# Patient Record
Sex: MALE | Race: WHITE | HISPANIC OR LATINO | Employment: FULL TIME | URBAN - METROPOLITAN AREA
[De-identification: names, ages, dates, MRNs, and addresses within clinical notes are randomized per-mention and may not be internally consistent; named-entity substitution may affect disease eponyms.]

---

## 2020-10-29 ENCOUNTER — HOSPITAL LABORATORY (OUTPATIENT)
Dept: OTHER | Facility: CLINIC | Age: 45
End: 2020-10-29

## 2020-11-05 LAB
BACTERIA SPEC CULT: NO GROWTH
BACTERIA SPEC CULT: NORMAL
Lab: NORMAL
Lab: NORMAL
SPECIMEN SOURCE: NORMAL
SPECIMEN SOURCE: NORMAL

## 2024-04-23 ENCOUNTER — LAB (OUTPATIENT)
Dept: LAB | Facility: CLINIC | Age: 49
End: 2024-04-23
Payer: COMMERCIAL

## 2024-04-23 DIAGNOSIS — R73.03 DIABETES MELLITUS, LATENT: ICD-10-CM

## 2024-04-23 DIAGNOSIS — E55.9 AVITAMINOSIS D: ICD-10-CM

## 2024-04-23 DIAGNOSIS — G62.9 PERIPHERAL NERVE DISORDER: ICD-10-CM

## 2024-04-23 DIAGNOSIS — M79.672 LEFT FOOT PAIN: ICD-10-CM

## 2024-04-23 DIAGNOSIS — I51.9 MYXEDEMA HEART DISEASE: ICD-10-CM

## 2024-04-23 DIAGNOSIS — M79.671 RIGHT FOOT PAIN: Primary | ICD-10-CM

## 2024-04-23 DIAGNOSIS — E03.9 MYXEDEMA HEART DISEASE: ICD-10-CM

## 2024-04-23 LAB
HBA1C MFR BLD: 5.1 %
TOTAL PROTEIN SERUM FOR ELP: 7.6 G/DL (ref 6.4–8.3)
TSH SERPL DL<=0.005 MIU/L-ACNC: 1.79 UIU/ML (ref 0.3–4.2)
VIT B12 SERPL-MCNC: <150 PG/ML (ref 232–1245)
VIT D+METAB SERPL-MCNC: 35 NG/ML (ref 20–50)

## 2024-04-23 PROCEDURE — 84165 PROTEIN E-PHORESIS SERUM: CPT | Mod: TC | Performed by: PATHOLOGY

## 2024-04-23 PROCEDURE — 36415 COLL VENOUS BLD VENIPUNCTURE: CPT

## 2024-04-23 PROCEDURE — 84165 PROTEIN E-PHORESIS SERUM: CPT | Mod: 26 | Performed by: PATHOLOGY

## 2024-04-23 PROCEDURE — 84443 ASSAY THYROID STIM HORMONE: CPT

## 2024-04-23 PROCEDURE — 82607 VITAMIN B-12: CPT

## 2024-04-23 PROCEDURE — 83921 ORGANIC ACID SINGLE QUANT: CPT

## 2024-04-23 PROCEDURE — 82306 VITAMIN D 25 HYDROXY: CPT

## 2024-04-23 PROCEDURE — 83036 HEMOGLOBIN GLYCOSYLATED A1C: CPT

## 2024-04-23 PROCEDURE — 84155 ASSAY OF PROTEIN SERUM: CPT

## 2024-04-24 LAB
ALBUMIN SERPL ELPH-MCNC: 5 G/DL (ref 3.7–5.1)
ALPHA1 GLOB SERPL ELPH-MCNC: 0.2 G/DL (ref 0.2–0.4)
ALPHA2 GLOB SERPL ELPH-MCNC: 0.6 G/DL (ref 0.5–0.9)
B-GLOBULIN SERPL ELPH-MCNC: 0.8 G/DL (ref 0.6–1)
GAMMA GLOB SERPL ELPH-MCNC: 1.1 G/DL (ref 0.7–1.6)
M PROTEIN SERPL ELPH-MCNC: 0 G/DL
PATH REPORT.COMMENTS IMP SPEC: NORMAL
PROT PATTERN SERPL ELPH-IMP: NORMAL

## 2024-04-25 LAB — METHYLMALONATE SERPL-SCNC: 0.36 UMOL/L (ref 0–0.4)

## 2024-07-19 ENCOUNTER — TRANSCRIBE ORDERS (OUTPATIENT)
Dept: OTHER | Age: 49
End: 2024-07-19

## 2024-07-19 DIAGNOSIS — M79.671 BILATERAL FOOT PAIN: Primary | ICD-10-CM

## 2024-07-19 DIAGNOSIS — M79.672 BILATERAL FOOT PAIN: Primary | ICD-10-CM

## 2024-10-09 ENCOUNTER — OFFICE VISIT (OUTPATIENT)
Dept: NEUROLOGY | Facility: CLINIC | Age: 49
End: 2024-10-09
Payer: COMMERCIAL

## 2024-10-09 DIAGNOSIS — R20.9 DISTURBANCE OF SKIN SENSATION: Primary | ICD-10-CM

## 2024-10-09 PROCEDURE — 11105 PUNCH BX SKIN EA SEP/ADDL: CPT | Performed by: PSYCHIATRY & NEUROLOGY

## 2024-10-09 PROCEDURE — 11104 PUNCH BX SKIN SINGLE LESION: CPT | Performed by: PSYCHIATRY & NEUROLOGY

## 2024-10-09 PROCEDURE — 88350 IMFLUOR EA ADDL 1ANTB STN PX: CPT | Performed by: PSYCHIATRY & NEUROLOGY

## 2024-10-09 PROCEDURE — 88348 ELECTRON MICROSCOPY DX: CPT | Performed by: PSYCHIATRY & NEUROLOGY

## 2024-10-09 NOTE — PROGRESS NOTES
Procedure note: Skin biopsy. Indication: sensory disturbance, skin. After obtaining informed consent, 3 mm skin biopsies were performed over the right foot and proximal calf. 1% lidocaine anesthesia and betadine sterile prep were used. The patient tolerated the procedure well. Wound care and patient education were provided by Tamera Solares.

## 2024-10-09 NOTE — LETTER
10/9/2024       RE: Gabriele Larkin  [address] [facility] [address]     Dear Colleague,    Thank you for referring your patient, Gabriele Larkin, to the [facility] [place] at [facility]. Please see a copy of my visit note below.    Procedure note: Skin biopsy. Indication: sensory disturbance, skin. After obtaining informed consent, 3 mm skin biopsies were performed over the right foot and proximal calf. 1% lidocaine anesthesia and betadine sterile prep were used. The patient tolerated the procedure well. Wound care and patient education were provided by Tamera Solares.       Again, thank you for allowing me to participate in the care of your patient.      Sincerely,    George Bowers MD

## 2024-10-28 ENCOUNTER — TELEPHONE (OUTPATIENT)
Dept: INTERVENTIONAL RADIOLOGY/VASCULAR | Facility: CLINIC | Age: 49
End: 2024-10-28
Payer: COMMERCIAL

## 2024-10-29 ENCOUNTER — APPOINTMENT (OUTPATIENT)
Dept: LAB | Facility: HOSPITAL | Age: 49
End: 2024-10-29
Attending: PHYSICIAN ASSISTANT
Payer: COMMERCIAL

## 2024-11-15 LAB — SCANNED LAB RESULT: NORMAL

## 2024-11-16 ENCOUNTER — HEALTH MAINTENANCE LETTER (OUTPATIENT)
Age: 49
End: 2024-11-16

## 2024-12-12 ENCOUNTER — TELEPHONE (OUTPATIENT)
Dept: INTERVENTIONAL RADIOLOGY/VASCULAR | Facility: CLINIC | Age: 49
End: 2024-12-12
Payer: COMMERCIAL

## 2024-12-13 ENCOUNTER — HOSPITAL ENCOUNTER (OUTPATIENT)
Dept: MRI IMAGING | Facility: HOSPITAL | Age: 49
Discharge: HOME | End: 2024-12-13
Attending: PHYSICIAN ASSISTANT
Payer: COMMERCIAL

## 2024-12-13 ENCOUNTER — HOSPITAL ENCOUNTER (OUTPATIENT)
Dept: RADIOLOGY | Facility: HOSPITAL | Age: 49
Discharge: HOME | End: 2024-12-13
Attending: PHYSICIAN ASSISTANT
Payer: COMMERCIAL

## 2024-12-13 ENCOUNTER — LAB (OUTPATIENT)
Dept: LAB | Facility: HOSPITAL | Age: 49
End: 2024-12-13
Attending: PHYSICIAN ASSISTANT
Payer: COMMERCIAL

## 2024-12-13 VITALS — HEART RATE: 61 BPM | DIASTOLIC BLOOD PRESSURE: 86 MMHG | OXYGEN SATURATION: 97 % | SYSTOLIC BLOOD PRESSURE: 131 MMHG

## 2024-12-13 DIAGNOSIS — M79.672 BILATERAL FOOT PAIN: ICD-10-CM

## 2024-12-13 DIAGNOSIS — M79.671 BILATERAL FOOT PAIN: Primary | ICD-10-CM

## 2024-12-13 DIAGNOSIS — G95.9 SPINAL CORD LESION (H): ICD-10-CM

## 2024-12-13 DIAGNOSIS — G95.9 SPINAL CORD LESION (H): Primary | ICD-10-CM

## 2024-12-13 DIAGNOSIS — M79.671 BILATERAL FOOT PAIN: ICD-10-CM

## 2024-12-13 DIAGNOSIS — M79.672 BILATERAL FOOT PAIN: Primary | ICD-10-CM

## 2024-12-13 LAB
APPEARANCE CSF: CLEAR
COLOR CSF: COLORLESS
GLUCOSE CSF-MCNC: 59 MG/DL (ref 40–70)
HIV 1+2 AB+HIV1 P24 AG SERPL QL IA: NONREACTIVE
PROT CSF-MCNC: 26.9 MG/DL (ref 15–45)
RBC # CSF MANUAL: 3 /UL (ref 0–2)
TUBE # CSF: 4
WBC # CSF MANUAL: 1 /UL (ref 0–5)

## 2024-12-13 PROCEDURE — 87798 DETECT AGENT NOS DNA AMP: CPT

## 2024-12-13 PROCEDURE — 72156 MRI NECK SPINE W/O & W/DYE: CPT

## 2024-12-13 PROCEDURE — 87389 HIV-1 AG W/HIV-1&-2 AB AG IA: CPT

## 2024-12-13 PROCEDURE — 87529 HSV DNA AMP PROBE: CPT

## 2024-12-13 PROCEDURE — 86592 SYPHILIS TEST NON-TREP QUAL: CPT

## 2024-12-13 PROCEDURE — 84157 ASSAY OF PROTEIN OTHER: CPT

## 2024-12-13 PROCEDURE — 88108 CYTOPATH CONCENTRATE TECH: CPT | Mod: TC

## 2024-12-13 PROCEDURE — 82784 ASSAY IGA/IGD/IGG/IGM EACH: CPT

## 2024-12-13 PROCEDURE — 89050 BODY FLUID CELL COUNT: CPT

## 2024-12-13 PROCEDURE — 87476 LYME DIS DNA AMP PROBE: CPT

## 2024-12-13 PROCEDURE — 82164 ANGIOTENSIN I ENZYME TEST: CPT

## 2024-12-13 PROCEDURE — 62328 DX LMBR SPI PNXR W/FLUOR/CT: CPT

## 2024-12-13 PROCEDURE — 87116 MYCOBACTERIA CULTURE: CPT

## 2024-12-13 PROCEDURE — 82945 GLUCOSE OTHER FLUID: CPT

## 2024-12-13 PROCEDURE — 255N000002 HC RX 255 OP 636: Performed by: PSYCHIATRY & NEUROLOGY

## 2024-12-13 PROCEDURE — A9585 GADOBUTROL INJECTION: HCPCS | Performed by: PSYCHIATRY & NEUROLOGY

## 2024-12-13 PROCEDURE — 87206 SMEAR FLUORESCENT/ACID STAI: CPT

## 2024-12-13 PROCEDURE — 86788 WEST NILE VIRUS AB IGM: CPT

## 2024-12-13 PROCEDURE — 36415 COLL VENOUS BLD VENIPUNCTURE: CPT

## 2024-12-13 RX ORDER — GADOBUTROL 604.72 MG/ML
0.1 INJECTION INTRAVENOUS ONCE
Status: COMPLETED | OUTPATIENT
Start: 2024-12-13 | End: 2024-12-13

## 2024-12-13 RX ADMIN — GADOBUTROL 7 ML: 604.72 INJECTION INTRAVENOUS at 07:53

## 2024-12-13 NOTE — DISCHARGE INSTRUCTIONS
Lumbar Puncture Discharge Instructions:  You had a lumbar puncture procedure, a medical procedure that can involve collecting a sample of cerebrospinal fluid (CSF - fluid that surrounds your spinal cord and brain).    Please follow these instructions as you recover:  - No driving for the next 24 hours.  - Remain motionless on your back in a flat position as much as possible for 12-16 hours after your lumbar puncture.  - When standing/walking, move slowly and passively.  - You may remove your Band-Aid from your lower back tomorrow.  - You may shower 24 hours after your procedure. Avoid direct contact with the shower water to your procedure site. Pat dry.  - No soaking (submerging under water) in hottubs, bathtubs and/or swimming pools until your wound is fully healed (approximately one week).    If you develop a headache when standing and it goes away when you lay down, you should lie down the rest of the day and increase your fluid intake.    Seek medical assistance for the following:   - If your headache does not go away after laying flat.  - If you have drainage from your puncture site.  - If your puncture site becomes red, has increased bruising and/or an increase in swelling.  - If you develop a fever over 100.4 F  *For sudden or severe symptoms, please go to the Emergency Department or call 911.

## 2024-12-13 NOTE — PROGRESS NOTES
Discussed the importance of lying flat for today with patient to prevent spinal leak.  Also instructed pt to have a  today. Pt verbalized understanding and discussed risk of not laying flat with MD as well as pt does have plans for later this evening.  Pt ambulatory at time of discharge and denies pain.

## 2024-12-14 LAB
HSV1 DNA CSF QL NAA+PROBE: NOT DETECTED
HSV2 DNA CSF QL NAA+PROBE: NOT DETECTED
SPECIMEN TYPE: NORMAL
VZV DNA SPEC QL NAA+PROBE: NOT DETECTED

## 2024-12-15 LAB
ACID FAST STAIN (ARUP): NORMAL
ALB CSF/SERPL: 3.9 RATIO
ALBUMIN CSF-MCNC: 18 MG/DL
ALBUMIN SERPL-MCNC: 4632 MG/DL
IGG CSF-MCNC: 2.5 MG/DL
IGG SERPL-MCNC: 1132 MG/DL
IGG SYNTH RATE SER+CSF CALC-MRATE: <0 MG/D
IGG/ALB CLEAR SER+CSF-RTO: 0.57 RATIO
IGG/ALB CSF: 0.14 RATIO
OLIGOCLONAL BANDS CSF ELPH-IMP: ABNORMAL
OLIGOCLONAL BANDS CSF ELPH-IMP: POSITIVE
OLIGOCLONAL BANDS CSF IEF: 10 BANDS
WNV IGG CSF IA-ACNC: 0.08 IV
WNV IGM CSF IA-ACNC: 0 IV

## 2024-12-16 LAB
ACE CSF-CCNC: 1.5 U/L
B BURGDOR DNA SPEC QL NAA+PROBE: NOT DETECTED
EBV DNA SPEC QL NAA+PROBE: NOT DETECTED
PATH REPORT.COMMENTS IMP SPEC: NORMAL
PATH REPORT.FINAL DX SPEC: NORMAL
PATH REPORT.GROSS SPEC: NORMAL
PATH REPORT.MICROSCOPIC SPEC OTHER STN: NORMAL
SCANNED LAB RESULT: NORMAL
VDRL CSF QL: NON REACTIVE

## 2024-12-17 ENCOUNTER — ANESTHESIA EVENT (OUTPATIENT)
Dept: EMERGENCY MEDICINE | Facility: HOSPITAL | Age: 49
End: 2024-12-17
Payer: COMMERCIAL

## 2024-12-17 ENCOUNTER — ANESTHESIA (OUTPATIENT)
Dept: EMERGENCY MEDICINE | Facility: HOSPITAL | Age: 49
End: 2024-12-17
Payer: COMMERCIAL

## 2024-12-17 ENCOUNTER — HOSPITAL ENCOUNTER (EMERGENCY)
Facility: HOSPITAL | Age: 49
Discharge: HOME | End: 2024-12-17
Attending: EMERGENCY MEDICINE
Payer: COMMERCIAL

## 2024-12-17 VITALS
HEIGHT: 68 IN | TEMPERATURE: 98.2 F | HEART RATE: 60 BPM | BODY MASS INDEX: 23.49 KG/M2 | SYSTOLIC BLOOD PRESSURE: 119 MMHG | WEIGHT: 155 LBS | RESPIRATION RATE: 18 BRPM | OXYGEN SATURATION: 98 % | DIASTOLIC BLOOD PRESSURE: 71 MMHG

## 2024-12-17 DIAGNOSIS — G97.1 POST LUMBAR PUNCTURE HEADACHE: ICD-10-CM

## 2024-12-17 LAB
ABO + RH BLD: NORMAL
ANION GAP SERPL CALCULATED.3IONS-SCNC: 13 MMOL/L (ref 7–15)
BASOPHILS # BLD AUTO: 0.1 10E3/UL (ref 0–0.2)
BASOPHILS NFR BLD AUTO: 1 %
BLD GP AB SCN SERPL QL: NEGATIVE
BUN SERPL-MCNC: 12.7 MG/DL (ref 6–20)
CALCIUM SERPL-MCNC: 10.1 MG/DL (ref 8.8–10.4)
CHLORIDE SERPL-SCNC: 100 MMOL/L (ref 98–107)
CREAT SERPL-MCNC: 0.9 MG/DL (ref 0.67–1.17)
EGFRCR SERPLBLD CKD-EPI 2021: >90 ML/MIN/1.73M2
EOSINOPHIL # BLD AUTO: 0.1 10E3/UL (ref 0–0.7)
EOSINOPHIL NFR BLD AUTO: 2 %
ERYTHROCYTE [DISTWIDTH] IN BLOOD BY AUTOMATED COUNT: 12 % (ref 10–15)
GLUCOSE SERPL-MCNC: 96 MG/DL (ref 70–99)
HCO3 SERPL-SCNC: 27 MMOL/L (ref 22–29)
HCT VFR BLD AUTO: 46.1 % (ref 40–53)
HGB BLD-MCNC: 16 G/DL (ref 13.3–17.7)
IMM GRANULOCYTES # BLD: 0 10E3/UL
IMM GRANULOCYTES NFR BLD: 1 %
INR PPP: 0.91 (ref 0.85–1.15)
JCPYV DNA SPEC QL NAA+PROBE: NOT DETECTED
LYMPHOCYTES # BLD AUTO: 1.8 10E3/UL (ref 0.8–5.3)
LYMPHOCYTES NFR BLD AUTO: 27 %
MCH RBC QN AUTO: 32.9 PG (ref 26.5–33)
MCHC RBC AUTO-ENTMCNC: 34.7 G/DL (ref 31.5–36.5)
MCV RBC AUTO: 95 FL (ref 78–100)
MONOCYTES # BLD AUTO: 0.4 10E3/UL (ref 0–1.3)
MONOCYTES NFR BLD AUTO: 6 %
NEUTROPHILS # BLD AUTO: 4.3 10E3/UL (ref 1.6–8.3)
NEUTROPHILS NFR BLD AUTO: 65 %
NRBC # BLD AUTO: 0 10E3/UL
NRBC BLD AUTO-RTO: 0 /100
PLATELET # BLD AUTO: 258 10E3/UL (ref 150–450)
POTASSIUM SERPL-SCNC: 4.1 MMOL/L (ref 3.4–5.3)
RBC # BLD AUTO: 4.87 10E6/UL (ref 4.4–5.9)
SODIUM SERPL-SCNC: 140 MMOL/L (ref 135–145)
SPECIMEN EXP DATE BLD: NORMAL
WBC # BLD AUTO: 6.7 10E3/UL (ref 4–11)

## 2024-12-17 PROCEDURE — 250N000013 HC RX MED GY IP 250 OP 250 PS 637: Performed by: EMERGENCY MEDICINE

## 2024-12-17 PROCEDURE — 82374 ASSAY BLOOD CARBON DIOXIDE: CPT | Performed by: EMERGENCY MEDICINE

## 2024-12-17 PROCEDURE — 36415 COLL VENOUS BLD VENIPUNCTURE: CPT | Performed by: EMERGENCY MEDICINE

## 2024-12-17 PROCEDURE — 80048 BASIC METABOLIC PNL TOTAL CA: CPT | Performed by: EMERGENCY MEDICINE

## 2024-12-17 PROCEDURE — 85610 PROTHROMBIN TIME: CPT | Performed by: EMERGENCY MEDICINE

## 2024-12-17 PROCEDURE — 250N000011 HC RX IP 250 OP 636: Performed by: EMERGENCY MEDICINE

## 2024-12-17 PROCEDURE — 96374 THER/PROPH/DIAG INJ IV PUSH: CPT | Mod: 59

## 2024-12-17 PROCEDURE — 62273 INJECT EPIDURAL PATCH: CPT

## 2024-12-17 PROCEDURE — 86900 BLOOD TYPING SEROLOGIC ABO: CPT | Performed by: EMERGENCY MEDICINE

## 2024-12-17 PROCEDURE — 258N000003 HC RX IP 258 OP 636: Performed by: EMERGENCY MEDICINE

## 2024-12-17 PROCEDURE — 370N000003 HC ANESTHESIA WARD SERVICE: Performed by: STUDENT IN AN ORGANIZED HEALTH CARE EDUCATION/TRAINING PROGRAM

## 2024-12-17 PROCEDURE — 250N000012 HC RX MED GY IP 250 OP 636 PS 637: Performed by: EMERGENCY MEDICINE

## 2024-12-17 PROCEDURE — 85004 AUTOMATED DIFF WBC COUNT: CPT | Performed by: EMERGENCY MEDICINE

## 2024-12-17 PROCEDURE — 96361 HYDRATE IV INFUSION ADD-ON: CPT | Mod: 59

## 2024-12-17 PROCEDURE — 99285 EMERGENCY DEPT VISIT HI MDM: CPT | Mod: 25

## 2024-12-17 RX ORDER — BUTALBITAL, ACETAMINOPHEN AND CAFFEINE 50; 325; 40 MG/1; MG/1; MG/1
1 TABLET ORAL ONCE
Status: COMPLETED | OUTPATIENT
Start: 2024-12-17 | End: 2024-12-17

## 2024-12-17 RX ORDER — KETOROLAC TROMETHAMINE 15 MG/ML
15 INJECTION, SOLUTION INTRAMUSCULAR; INTRAVENOUS ONCE
Status: COMPLETED | OUTPATIENT
Start: 2024-12-17 | End: 2024-12-17

## 2024-12-17 RX ADMIN — BUTALBITAL, ACETAMINOPHEN, AND CAFFEINE 1 TABLET: 50; 325; 40 TABLET ORAL at 13:37

## 2024-12-17 RX ADMIN — DEXAMETHASONE 10 MG: 2 TABLET ORAL at 13:37

## 2024-12-17 RX ADMIN — SODIUM CHLORIDE 1000 ML: 9 INJECTION, SOLUTION INTRAVENOUS at 13:39

## 2024-12-17 RX ADMIN — KETOROLAC TROMETHAMINE 15 MG: 15 INJECTION, SOLUTION INTRAMUSCULAR; INTRAVENOUS at 13:40

## 2024-12-17 ASSESSMENT — ACTIVITIES OF DAILY LIVING (ADL)
ADLS_ACUITY_SCORE: 41

## 2024-12-17 ASSESSMENT — COLUMBIA-SUICIDE SEVERITY RATING SCALE - C-SSRS
1. IN THE PAST MONTH, HAVE YOU WISHED YOU WERE DEAD OR WISHED YOU COULD GO TO SLEEP AND NOT WAKE UP?: NO
2. HAVE YOU ACTUALLY HAD ANY THOUGHTS OF KILLING YOURSELF IN THE PAST MONTH?: NO
6. HAVE YOU EVER DONE ANYTHING, STARTED TO DO ANYTHING, OR PREPARED TO DO ANYTHING TO END YOUR LIFE?: NO

## 2024-12-17 NOTE — Clinical Note
Gabriele Larkin was seen and treated in our emergency department on 12/17/2024.  He may return to work on 12/19/2024.       If you have any questions or concerns, please don't hesitate to call.      Keila Marshall MD

## 2024-12-17 NOTE — ED PROVIDER NOTES
EMERGENCY DEPARTMENT ENCOUNTER      NAME: Gabriele Larkin  AGE: 49 year old male  YOB: 1975  MRN: [id number]  EVALUATION DATE & TIME: 12/17/2024  1:44 PM    PCP: No Ref-Primary, Physician    ED PROVIDER: Keila Marshall M.D.      Chief Complaint   Patient presents with    Headache         FINAL IMPRESSION:  1. Post lumbar puncture headache          ED COURSE & MEDICAL DECISION MAKING:    ED Course as of 12/17/24 1640   Tue Dec 17, 2024   1218 Pt here s/p 17 mL removed 12/13 on outpatient scheduled lumbar puncture ordered by neurologist for bilateral foot tingling chronic ongoing, with headache and s/p 2 days ago 12/15 [place] ED visit and improved 4/10 headache after NSAID/excedrin/compazine, now with 6/10 headache, neuro intact, will d/w IR who did LP blood patch vs. Anesthesia and determine options, as he is neuro intact pending decadron, butalbital-tylenol-caffeine, toradol in interim with IVF   1253 I spoke with interventional neuroradiologist Dr Bentley with recent changes re: who does blood patch, she notes they did not do the procedure or blood patches at this point, does not go to [facility], likely to need one, will d/w anesthesia here   1345 Per [facility], anesthesia had reported they don't overall do blood patches, but then [facility] investigated and found they can indeed do them. I called anesthesia *[id number] and Dr Figueroa was in procedure, notes she will call me back in the ER shortly   1356 I spoke with Dr Figueroa from anesthesia who notes they can bring with BMP and CBC WNL and T&S done, platelet count 258, whe will inestigate whether they can take patient to PACU for blood patch or not. She will then call me back shortly   1415 Patient updated re: awaiting callback, on IV and receiving medications   1439 Per Anesthesia Dr Figueroa at bedside,  will do blood patch here as PACU too full, charge LARRY Griffith getting room available and anesthesia getting supplies to do blood patch, then patient to [facility]  1h afterwards and will observe here prior to discharge, patient consenting and feeling well   1622 Pt s/p blood patch at 1540, feeling well, laying flat, IVF still ongoing   1639 Pt feeling improved still after blood patch and eager for discharge, pain free. Patient discharged after being provided with extensive anticipatory guidance and given return precautions, importance of PMD follow-up emphasized.        Pertinent Labs & Imaging studies reviewed. (See chart for details)    Medical Decision Making  Obtained supplemental history:Supplemental history obtained?: No  Reviewed external records: External records reviewed?: Documented in chart  Care impacted by chronic illness:Documented in Chart  Did you consider but not order tests?: Work up considered but not performed and documented in chart, if applicable  Did you interpret images independently?: Independent interpretation of ECG and images noted in documentation, when applicable.  Consultation discussion with other provider:Did you involve another provider (consultant, , pharmacy, etc.)?: I discussed the care with another health care provider, see documentation for details.  Discharge. No recommendations on prescription strength medication(s). I considered admission, but discharged patient after significant clinical improvement.    MIPS: Not Applicable      At the conclusion of the encounter I discussed the results of all of the tests and the disposition. The questions were answered. The patient or family acknowledged understanding and was agreeable with the care plan.     MEDICATIONS GIVEN IN THE EMERGENCY:  Medications   ketorolac (TORADOL) injection 15 mg (15 mg Intravenous $Given 12/17/24 1340)   butalbital-acetaminophen-caffeine (ESGIC) per tablet 1 tablet (1 tablet Oral $Given 12/17/24 1337)   dexAMETHasone (DECADRON) tablet 10 mg (10 mg Oral $Given 12/17/24 1337)   sodium chloride 0.9% BOLUS 1,000 mL (0 mLs Intravenous Stopped 12/17/24 1446)       NEW  PRESCRIPTIONS STARTED AT TODAY'S ER VISIT  New Prescriptions    No medications on file          =================================================================    [facility]      Gabriele Larkin is a 49 year old male with PMHx of s/p lumbar puncture on 12/13 (4 days ago) who presents to the ED today via walk in with a headache.     Per chart review, the patient was seen in the [facility] ED on 12/15/2024 for evaluation of a headache. Patient describes that he had a lumbar puncture on 12/13 (4 days ago) at [place] where 17 ml of CSF was removed. He reports that he spent most of that day laying down and felt fine upon returning home. He developed a headache on 12/14 (3 days ago) and had been taking tylenol without relief. Spoke with the on-call anesthesiology team who unfortunately do not perform blood patches in the ED. In the ED, attempted symptom control with IV headache medications. Patient was discharged with plan to follow up closely with outpatient Neurology team at [place].     Per the patient, he endorses an ongoing headache since his lumbar puncture on 12/13 (4 days ago). He reports that the headache worsened on 12/15 (2 days ago) and has not taken any medications for the headache. Today, patient rates the headache a 6/10. He denies an history of vertigo, dizziness, weakness in his arms or legs or vomiting. No other concerns at this time.     REVIEW OF SYSTEMS   All other systems reviewed and are negative except as noted above in HPI.    PAST MEDICAL HISTORY:  History reviewed. No pertinent past medical history.    PAST SURGICAL HISTORY:  History reviewed. No pertinent surgical history.    CURRENT MEDICATIONS:    No current outpatient medications on file.      ALLERGIES:  Allergies   Allergen Reactions    Lidocaine Swelling       FAMILY HISTORY:  History reviewed. No pertinent family history.    SOCIAL HISTORY:   Social History     Socioeconomic History    Marital status:      Social Drivers of  "Health     Food Insecurity: No Food Insecurity (5/22/2024)    Received from [facility]    Hunger Vital Sign     Worried About Running Out of Food in the Last Year: Never true     Ran Out of Food in the Last Year: Never true   Transportation Needs: No Transportation Needs (5/22/2024)    Received from [facility]    PRAPARE - Transportation     Lack of Transportation (Medical): No     Lack of Transportation (Non-Medical): No   Physical Activity: Insufficiently Active (5/22/2024)    Received from [facility]    Exercise Vital Sign     Days of Exercise per Week: 1 day     Minutes of Exercise per Session: 30 min   Social Connections: Unknown (7/26/2019)    Received from [facility]    Social Connection and Isolation Panel [NHANES]     Marital Status:    Interpersonal Safety: Not At Risk (7/26/2019)    Received from [facility]    Humiliation, Afraid, Rape, and Kick questionnaire     Fear of Current or Ex-Partner: No     Emotionally Abused: No     Physically Abused: No     Sexually Abused: No   Housing Stability: Low Risk  (5/22/2024)    Received from [facility]    Housing Stability     What is your living situation today?: I have a steady place to live       VITALS:  Patient Vitals for the past 24 hrs:   BP Temp Temp src Pulse Resp SpO2 Height Weight   12/17/24 1343 98/58 -- -- 56 18 100 % -- --   12/17/24 1206 139/82 98.2  F (36.8  C) Oral 67 16 99 % 1.727 m (5' 8\") 70.3 kg (155 lb)       PHYSICAL EXAM    GENERAL: Awake, alert.  In no acute distress.   HEENT: Normocephalic, atraumatic.  Pupils equal, round and reactive.  Conjunctiva normal.  EOMI.  NECK: No stridor or apparent deformity.  PULMONARY: Symmetrical breath sounds without distress.  Lungs clear to auscultation bilaterally without wheezes, rhonchi or rales.  CARDIO: Regular rate and rhythm.  No significant murmur, rub or gallop.  Radial pulses strong and symmetrical.  ABDOMINAL: Abdomen soft, non-distended and non-tender to palpation.  No CVAT, " no palpable hepatosplenomegaly.  EXTREMITIES: No lower extremity swelling or edema.    NEURO: Alert and oriented to person, place and time.  Cranial nerves grossly intact.  No focal motor deficit. Gait normal.  PSYCH: Normal mood and affect  SKIN: No rashes      LAB:  All pertinent labs reviewed and interpreted.  Results for orders placed or performed during the hospital encounter of 12/17/24   Basic metabolic panel   Result Value Ref Range    Sodium 140 135 - 145 mmol/L    Potassium 4.1 3.4 - 5.3 mmol/L    Chloride 100 98 - 107 mmol/L    Carbon Dioxide (CO2) 27 22 - 29 mmol/L    Anion Gap 13 7 - 15 mmol/L    Urea Nitrogen 12.7 6.0 - 20.0 mg/dL    Creatinine 0.90 0.67 - 1.17 mg/dL    GFR Estimate >90 >60 mL/min/1.73m2    Calcium 10.1 8.8 - 10.4 mg/dL    Glucose 96 70 - 99 mg/dL   Result Value Ref Range    INR 0.91 0.85 - 1.15   CBC with platelets and differential   Result Value Ref Range    WBC Count 6.7 4.0 - 11.0 10e3/uL    RBC Count 4.87 4.40 - 5.90 10e6/uL    Hemoglobin 16.0 13.3 - 17.7 g/dL    Hematocrit 46.1 40.0 - 53.0 %    MCV 95 78 - 100 fL    MCH 32.9 26.5 - 33.0 pg    MCHC 34.7 31.5 - 36.5 g/dL    RDW 12.0 10.0 - 15.0 %    Platelet Count 258 150 - 450 10e3/uL    % Neutrophils 65 %    % Lymphocytes 27 %    % Monocytes 6 %    % Eosinophils 2 %    % Basophils 1 %    % Immature Granulocytes 1 %    NRBCs per 100 WBC 0 <1 /100    Absolute Neutrophils 4.3 1.6 - 8.3 10e3/uL    Absolute Lymphocytes 1.8 0.8 - 5.3 10e3/uL    Absolute Monocytes 0.4 0.0 - 1.3 10e3/uL    Absolute Eosinophils 0.1 0.0 - 0.7 10e3/uL    Absolute Basophils 0.1 0.0 - 0.2 10e3/uL    Absolute Immature Granulocytes 0.0 <=0.4 10e3/uL    Absolute NRBCs 0.0 10e3/uL   Adult Type and Screen   Result Value Ref Range    ABO/RH(D) O NEG     Antibody Screen Negative Negative    SPECIMEN EXPIRATION DATE [id number]          Moises MEI, am serving as a scribe to document services personally performed by Dr. Keila Marshall based on my observation and  the provider's statements to me. I, Keila Marshall MD attest that Moises Brady is acting in a scribe capacity, has observed my performance of the services and has documented them in accordance with my direction.       Keila Marshall MD  12/17/24 1640

## 2024-12-17 NOTE — ANESTHESIA PREPROCEDURE EVALUATION
"Anesthesia Pre-Procedure Evaluation    Patient: Gabriele Gomezelor   MRN: [id number] : 1975        Procedure : * No procedures listed *          No past medical history on file.   No past surgical history on file.   Allergies   Allergen Reactions    Lidocaine Swelling      Social History     Tobacco Use    Smoking status: Not on file    Smokeless tobacco: Not on file   Substance Use Topics    Alcohol use: Not on file      Wt Readings from Last 1 Encounters:   24 70.3 kg (155 lb)        Anesthesia Evaluation            ROS/MED HX  ENT/Pulmonary:  - neg pulmonary ROS  (-) sleep apnea and recent URI   Neurologic: Comment: Bilateral feet tingling/numbess. S/P LP on Friday, PDPH developed . Classic PDPH that resolves lying down and increases sitting up. No motor changes, back pain, fevers, lower back redness.       Cardiovascular:    (-) hypertension and CAD   METS/Exercise Tolerance:     Hematologic:       Musculoskeletal:       GI/Hepatic:       Renal/Genitourinary:       Endo:       Psychiatric/Substance Use:       Infectious Disease:       Malignancy:       Other:               OUTSIDE LABS:  CBC:   Lab Results   Component Value Date    WBC 6.7 2024    HGB 16.0 2024    HCT 46.1 2024     2024     BMP:   Lab Results   Component Value Date     2024    POTASSIUM 4.1 2024    CHLORIDE 100 2024    CO2 27 2024    BUN 12.7 2024    CR 0.90 2024    GLC 96 2024     COAGS:   Lab Results   Component Value Date    INR 0.91 2024     POC: No results found for: \"BGM\", \"HCG\", \"HCGS\"  HEPATIC: No results found for: \"ALBUMIN\", \"PROTTOTAL\", \"ALT\", \"AST\", \"GGT\", \"ALKPHOS\", \"BILITOTAL\", \"BILIDIRECT\", \"KLAUS\"  OTHER:   Lab Results   Component Value Date    A1C 5.1 2024    VINCE 10.1 2024    TSH 1.79 2024       Anesthesia Plan    ASA Status:  3       Anesthesia Type: Epidural.              Consents    Anesthesia Plan(s) and " associated risks, benefits, and realistic alternatives discussed. Questions answered and patient/representative(s) expressed understanding.     - Discussed: Risks, Benefits and Alternatives for BOTH SEDATION and the PROCEDURE were discussed     - Discussed with:  Patient            Postoperative Care            Comments:               Justin Figueroa MD    I have reviewed the pertinent notes and labs in the chart from the past 30 days and (re)examined the patient.  Any updates or changes from those notes are reflected in this note.

## 2024-12-17 NOTE — ED TRIAGE NOTES
Patient presents here for evaluation of a headache that has persisted over the past five days following a lumbar puncture. He was seen at [facility] on 12/15 and was medicated and given IV fluids. No photophobia. Laying down does relieve the pain.      Triage Assessment (Adult)       Row Name 12/17/24 1208          Triage Assessment    Airway WDL WDL        Respiratory WDL    Respiratory WDL WDL        Skin Circulation/Temperature WDL    Skin Circulation/Temperature WDL WDL        Cardiac WDL    Cardiac WDL WDL        Peripheral/Neurovascular WDL    Peripheral Neurovascular WDL WDL        Cognitive/Neuro/Behavioral WDL    Cognitive/Neuro/Behavioral WDL WDL

## 2024-12-17 NOTE — ANESTHESIA POSTPROCEDURE EVALUATION
Patient: Gabriele Larkin    Procedure: * No procedures listed *       Anesthesia Type:  Epidural    Note:  Disposition: Outpatient   Postop Pain Control:             Sign Out: Well controlled pain   PONV:    Neuro/Psych:             Sign Out: Acceptable/Baseline neuro status   Airway/Respiratory:             Sign Out: Acceptable/Baseline resp. status   CV/Hemodynamics:             Sign Out: Acceptable CV status   Other NRE:    DID A NON-ROUTINE EVENT OCCUR?     Event details/Postop Comments:  Re evaluated patient in the ED. No HA present. Gross motor exam normal. Back exam unremarkable. Discussed with patient return precautions such as lower extremity weakness/ reduced sensation, fevers, uncontrolled back pain.            Last vitals:  Vitals:    12/17/24 1206 12/17/24 1343   BP: 139/82 98/58   Pulse: 67 56   Resp: 16 18   Temp: 36.8  C (98.2  F)    SpO2: 99% 100%       Electronically Signed By: Justin Figueroa MD  December 17, 2024  4:37 PM

## 2024-12-17 NOTE — ED NOTES
"Patient became very pale and lighteaded while in waiting room during medications and labs. He stated \"I am going to pass out\" Recheck of BP was 98/58 with 100% O2 RA and pulse of 56. Roomed in hallway B  "

## 2024-12-17 NOTE — ANESTHESIA PROCEDURE NOTES
Blood Patch:    Staff -        Anesthesiologist:  Justin Figueroa MD       Performed By: anesthesiologist       Patient location during procedure: ED       Start time: 12/17/2024 3:15 PM       End time: 12/17/2024 3:30 PM  Preanesthetic Checklist:        Completed: patient identified, pre-op evaluation, timeout performed, IV checked, risks and benefits discussed, monitors and equipment checked and anesthesia consent given    Procedure Information:        Location of venous blood draw: arm (L arm, fresh IV stick)       Volume of blood injected: 20 mL       Blood collected by assistant using sterile technique.       Patient position: sitting       Prep: Chloraprep       Sterile Barriers: gloves, mask, washed/disinfected hands and drape       Patient monitoring: continuous pulse oximetry and blood pressure monitoring       Approach: midline       Injection technique: AYANA saline and AYANA air       Location: L3-4       Injection method: Touhy needle       Needle gauge: 18 G       Needle length (cm): 9 cm       Loss of resistance depth: 5 cm    Assessment:       Events: well tolerated       Reason for Blood Patch: spinal headache   Comments:  Blood injected slowly 5mL at a time, no pain or paresthesias reported. BP and pulse ox monitored throughout procedure. VSS

## 2024-12-21 ENCOUNTER — HOSPITAL ENCOUNTER (OUTPATIENT)
Facility: CLINIC | Age: 49
End: 2024-12-21
Payer: COMMERCIAL

## 2024-12-31 ENCOUNTER — HOSPITAL ENCOUNTER (OUTPATIENT)
Facility: CLINIC | Age: 49
End: 2024-12-31
Payer: COMMERCIAL

## 2024-12-31 RX ORDER — PANTOPRAZOLE SODIUM 20 MG/1
TABLET, DELAYED RELEASE ORAL
COMMUNITY
End: 2024-12-31 | Stop reason: HOSPADM

## 2024-12-31 RX ORDER — CALCIUM ACETATE 667 MG/1
TABLET ORAL
COMMUNITY
End: 2024-12-31 | Stop reason: HOSPADM

## 2024-12-31 RX ORDER — SENNOSIDES 8.6 MG
TABLET ORAL
COMMUNITY
Start: 2024-07-29 | End: 2024-12-31 | Stop reason: HOSPADM

## 2024-12-31 RX ORDER — LORATADINE 10 MG/1
1 TABLET ORAL DAILY
COMMUNITY
End: 2024-12-31 | Stop reason: HOSPADM

## 2024-12-31 RX ORDER — GABAPENTIN 100 MG/1
CAPSULE ORAL
COMMUNITY
Start: 2024-07-24 | End: 2024-12-31 | Stop reason: HOSPADM

## 2024-12-31 RX ORDER — MULTIVIT WITH MINERALS/LUTEIN
TABLET ORAL
COMMUNITY
End: 2024-12-31 | Stop reason: HOSPADM

## 2024-12-31 RX ORDER — CELECOXIB 100 MG/1
CAPSULE ORAL
COMMUNITY
Start: 2024-06-20 | End: 2024-12-31 | Stop reason: HOSPADM

## 2024-12-31 RX ORDER — HYDROXYZINE HYDROCHLORIDE 25 MG/1
TABLET, FILM COATED ORAL
COMMUNITY
Start: 2024-07-29 | End: 2024-12-31 | Stop reason: HOSPADM

## 2024-12-31 RX ORDER — CHLORAL HYDRATE 500 MG
CAPSULE ORAL
COMMUNITY
End: 2024-12-31 | Stop reason: HOSPADM

## 2024-12-31 RX ORDER — CHOLECALCIFEROL (VITAMIN D3) 50 MCG
TABLET ORAL
COMMUNITY
End: 2024-12-31 | Stop reason: HOSPADM

## 2024-12-31 RX ORDER — CHOLECALCIFEROL (VITAMIN D3) 1250 MCG
CAPSULE ORAL
COMMUNITY
Start: 2024-11-06 | End: 2024-12-31 | Stop reason: HOSPADM

## 2025-02-17 ENCOUNTER — LAB (OUTPATIENT)
Dept: LAB | Facility: CLINIC | Age: 50
End: 2025-02-17
Payer: COMMERCIAL

## 2025-02-17 DIAGNOSIS — E55.9 AVITAMINOSIS D: Primary | ICD-10-CM

## 2025-02-17 DIAGNOSIS — E53.8 BIOTIN-(PROPIONYL-COA-CARBOXYLASE) LIGASE DEFICIENCY: ICD-10-CM

## 2025-02-17 LAB
VIT B12 SERPL-MCNC: 844 PG/ML (ref 232–1245)
VIT D+METAB SERPL-MCNC: 80 NG/ML (ref 20–50)

## 2025-02-17 PROCEDURE — 36415 COLL VENOUS BLD VENIPUNCTURE: CPT

## 2025-02-17 PROCEDURE — 83921 ORGANIC ACID SINGLE QUANT: CPT

## 2025-02-17 PROCEDURE — 82306 VITAMIN D 25 HYDROXY: CPT

## 2025-02-17 PROCEDURE — 82607 VITAMIN B-12: CPT

## 2025-02-19 LAB — METHYLMALONATE SERPL-SCNC: <0.1 UMOL/L (ref 0–0.4)

## (undated) RX ORDER — LIDOCAINE HYDROCHLORIDE 10 MG/ML
INJECTION, SOLUTION EPIDURAL; INFILTRATION; INTRACAUDAL; PERINEURAL
Status: DISPENSED
Start: 2024-10-09